# Patient Record
Sex: FEMALE
[De-identification: names, ages, dates, MRNs, and addresses within clinical notes are randomized per-mention and may not be internally consistent; named-entity substitution may affect disease eponyms.]

---

## 2018-07-17 ENCOUNTER — HOSPITAL ENCOUNTER (INPATIENT)
Dept: HOSPITAL 14 - H.ER | Age: 48
LOS: 5 days | Discharge: HOME | DRG: 134 | End: 2018-07-22
Attending: INTERNAL MEDICINE | Admitting: INTERNAL MEDICINE
Payer: MEDICAID

## 2018-07-17 VITALS — BODY MASS INDEX: 28.9 KG/M2

## 2018-07-17 DIAGNOSIS — M54.30: ICD-10-CM

## 2018-07-17 DIAGNOSIS — M51.26: ICD-10-CM

## 2018-07-17 DIAGNOSIS — J30.2: ICD-10-CM

## 2018-07-17 DIAGNOSIS — F41.9: ICD-10-CM

## 2018-07-17 DIAGNOSIS — F17.200: ICD-10-CM

## 2018-07-17 DIAGNOSIS — E87.6: ICD-10-CM

## 2018-07-17 DIAGNOSIS — I43: ICD-10-CM

## 2018-07-17 DIAGNOSIS — F32.9: ICD-10-CM

## 2018-07-17 DIAGNOSIS — M19.90: ICD-10-CM

## 2018-07-17 DIAGNOSIS — F45.9: ICD-10-CM

## 2018-07-17 DIAGNOSIS — Z79.899: ICD-10-CM

## 2018-07-17 DIAGNOSIS — I16.0: Primary | ICD-10-CM

## 2018-07-17 DIAGNOSIS — I11.9: ICD-10-CM

## 2018-07-17 DIAGNOSIS — R51: ICD-10-CM

## 2018-07-17 DIAGNOSIS — Z79.82: ICD-10-CM

## 2018-07-17 LAB
BASOPHILS # BLD AUTO: 0.1 K/UL (ref 0–0.2)
BASOPHILS NFR BLD: 0.8 % (ref 0–2)
BUN SERPL-MCNC: 13 MG/DL (ref 7–17)
CALCIUM SERPL-MCNC: 8.9 MG/DL (ref 8.4–10.2)
EOSINOPHIL # BLD AUTO: 0.1 K/UL (ref 0–0.7)
EOSINOPHIL NFR BLD: 1.2 % (ref 0–4)
ERYTHROCYTE [DISTWIDTH] IN BLOOD BY AUTOMATED COUNT: 13.5 % (ref 11.5–14.5)
GFR NON-AFRICAN AMERICAN: 60
HDLC SERPL-MCNC: 50 MG/DL (ref 30–70)
HGB BLD-MCNC: 13.4 G/DL (ref 12–16)
LDLC SERPL-MCNC: 75 MG/DL (ref 0–129)
LYMPHOCYTES # BLD AUTO: 3.8 K/UL (ref 1–4.3)
LYMPHOCYTES NFR BLD AUTO: 47.7 % (ref 20–40)
MCH RBC QN AUTO: 32.4 PG (ref 27–31)
MCHC RBC AUTO-ENTMCNC: 34.7 G/DL (ref 33–37)
MCV RBC AUTO: 93.2 FL (ref 81–99)
MONOCYTES # BLD: 0.6 K/UL (ref 0–0.8)
MONOCYTES NFR BLD: 7.4 % (ref 0–10)
NEUTROPHILS # BLD: 3.4 K/UL (ref 1.8–7)
NEUTROPHILS NFR BLD AUTO: 42.9 % (ref 50–75)
NRBC BLD AUTO-RTO: 0.1 % (ref 0–0)
PLATELET # BLD: 203 K/UL (ref 130–400)
PMV BLD AUTO: 9.2 FL (ref 7.2–11.7)
RBC # BLD AUTO: 4.15 MIL/UL (ref 3.8–5.2)
WBC # BLD AUTO: 7.9 K/UL (ref 4.8–10.8)

## 2018-07-17 NOTE — CARD
--------------- APPROVED REPORT --------------





Date of service: 07/17/2018



EXAM: Two-dimensional and M-mode echocardiogram with Doppler and 

color Doppler.



Other Information 

Quality : GoodRhythm : NSR



INDICATION

Hypertension/HCVD



2D DIMENSIONS 

IVSd1.16   (0.7-1.1cm)LVDd4.86   (3.9-5.9cm)

LVOT Diameter2.10   (1.8-2.4cm)PWd1.04   (0.7-1.1cm)

IVSs1.46   (0.8-1.2cm)LVDs3.48   (2.5-4.0cm)

FS (%) 28.4   %PWs1.35   (0.8-1.2cm)



M-Mode DIMENSIONS 

Left Atrium (MM)3.82   (2.5-4.0cm)IVSd0.97   (0.7-1.1cm)

Aortic Root3.35   (2.2-3.7cm)LVDd4.71   (4.0-5.6cm)

Aortic Cusp Exc.2.03   (1.5-2.0cm)PWd1.18   (0.7-1.1cm)

IVSs1.32   cmFS (%) 19   %

LVDs3.82   (2.0-3.8cm)PWs1.38   cm



Aortic Valve

AoV Peak Uxwjomkj27.1cm/sAoV VTI19.3cmAO Peak GR.4mmHg

LVOT Peak Ajyazfsq68.3cm/sLVOT VTI16.04cmAO Mean GR.2mmHg

FRANCESCO (VMAX)1.89cj7XOO (VTI)1.43cm2



Mitral Valve

MV E Dwblkrwd25.5cm/sMV DECEL [id number] A Afureidh85.5cm/s

MV AZB92snZ/A ratio1.1MVA (PHT)3.98cm2



TDI

Lateral E' Peak V10.43cm/sMedial E' Peak V6.52cm/sE/Lateral E'5.5

E/Medial E'8.8



Pulmonary Valve

PV Peak Rzdetgfk55.4cm/s



Tricuspid Valve

TR Peak Uqfcmfdr112im/sRAP ITVNAJPJ50kxEvCO Peak Gr.14mmHg

WVVY98aaHw



 LEFT VENTRICLE 

The left ventricle is normal size.

There is mild to moderate concentric left ventricular hypertrophy.

The systolic function is mildly impaired. LFEF 35 TO 40%

Regional wall motion abnormalities noted. Mild global hypokinesia of 

the left ventricle. 

The left ventricular diastolic function is normal.

No left ventricle thrombus noted on this study.

There is no ventricular septal defect visualized.

There is no left ventricular aneurysm. 

There is no mass noted in the left ventricle.



 RIGHT VENTRICLE 

The right ventricle is normal size.

There is normal right ventricular wall thickness.

The right ventricular systolic function is normal.



 ATRIA 

The left atrium size is normal.

The right atrium size is normal.

The interatrial septum is intact with no evidence for an atrial 

septal defect.



 AORTIC VALVE 

The aortic valve is normal in structure.

No aortic regurgitation is present.

There is no aortic valvular stenosis. 

There is no aortic valvular vegetation.



 MITRAL VALVE 

The mitral valve is normal in structure.

There is no evidence of mitral valve prolapse.

There is no mitral valve stenosis.

Mitral regurgitation is trace to mild.



 TRICUSPID VALVE 

The tricuspid valve is normal in structure.

There is no tricuspid valve regurgitation noted.

There is no tricuspid valve prolapse or vegetation.

There is no tricuspid valve stenosis. 



 PULMONIC VALVE 

The pulmonary valve is normal in structure.

There is no pulmonic valvular regurgitation. 

There is no pulmonic valvular stenosis.



 GREAT VESSELS 

The aortic root is normal in size.

The IVC is normal in size and collapses >50% with inspiration.



 PERICARDIAL EFFUSION 

The pericardium appears normal.

There is no pleural effusion.



<Conclusion>

The left ventricle is normal size.

There is mild to moderate concentric left ventricular hypertrophy.

The systolic function is mildly impaired. LFEF 35 TO 40%

Regional wall motion abnormalities noted. Mild global hypokinesia of 

the left ventricle.

The left ventricular diastolic function is normal.

Mitral regurgitation is trace to mild.

## 2018-07-17 NOTE — RAD
Date of service: 



07/17/2018



HISTORY:

stroke eval  



COMPARISON:

4/9/2014 



FINDINGS:



LUNGS:

No active pulmonary disease.



PLEURA:

No significant pleural effusion identified, no pneumothorax apparent.



CARDIOVASCULAR:

Normal.



OSSEOUS STRUCTURES:

No significant abnormalities.



VISUALIZED UPPER ABDOMEN:

Normal.



OTHER FINDINGS:

None.



IMPRESSION:

No active disease.

## 2018-07-17 NOTE — CT
Date of service: 



07/17/2018



PROCEDURE:  CT HEAD WITHOUT CONTRAST.



HISTORY:

stroke eval



COMPARISON:

None available. 



TECHNIQUE:

Axial computed tomography images were obtained through the head/brain 

without intravenous contrast.  



Radiation dose:



Total exam DLP = 839.02 mGy-cm.



This CT exam was performed using one or more of the following dose 

reduction techniques: Automated exposure control, adjustment of the 

mA and/or kV according to patient size, and/or use of iterative 

reconstruction technique.



FINDINGS:



HEMORRHAGE:

No intracranial hemorrhage. 



BRAIN:

No mass effect or edema.  No atrophy or chronic microvascular 

ischemic changes.



VENTRICLES:

Unremarkable. No hydrocephalus. 



CALVARIUM:

Unremarkable.



PARANASAL SINUSES:

Unremarkable as visualized. No significant inflammatory changes.



MASTOID AIR CELLS:

Unremarkable as visualized. No inflammatory changes.



OTHER FINDINGS:

None.



IMPRESSION:

Normal CT of the Head.  No evidence of acute intracranial hemorrhage.



Preliminary interpretation of this examination was reported by 

[facility] at 8:06 a.m. on 7/17/2018. There is concurrence of 

this report with the preliminary interpretation.

## 2018-07-17 NOTE — CP.PCM.CON
History of Present Illness





- History of Present Illness


History of Present Illness: 





Neurology Consultation Note:





Mrs. Ingram is a 47-year-old woman with a past medical history of HTN, 

depression, sciatica, herniated disc, who is in town for her daughter giving 

birth and missed several doses of her antihypertensive medication, developed 

dizziness and numbness of her face from the nose to the chin, bilaterally, that 

was associated with a brief episode of speech difficulty (lasting about 2 

minutes).  Her BP was 183/133 mm Hg.  CT scan of the head did not show any 

acute findings.  Symptoms resolved completely.  NIHSS was 0.  





Review of Systems





- Review of Systems


All systems: reviewed and no additional remarkable complaints except





Past Patient History





- Past Medical History & Family History


Past Medical History?: Yes





- Past Social History


Smoking Status: Current Some Days Smoker





- CARDIAC


Hx Cardiac Disorders: Yes


Hx Hypertension: Yes





- PULMONARY


Hx Respiratory Disorders: No





- NEUROLOGICAL


Hx Neurological Disorder: No





- HEENT


Hx HEENT Problems: No





- RENAL


Hx Chronic Kidney Disease: No





- ENDOCRINE/METABOLIC


Hx Endocrine Disorders: No





- HEMATOLOGICAL/ONCOLOGICAL


Hx Blood Disorders: No


Hx AIDS: No


Hx Human Immunodeficiency Virus (HIV): No





- INTEGUMENTARY


Hx Dermatological Problems: No





- MUSCULOSKELETAL/RHEUMATOLOGICAL


Hx Musculoskeletal Disorders: Yes


Hx Arthritis: Yes


Hx Back Pain: Yes


Hx Falls: No


Hx Herniated Disk: Yes


Other/Comment: Sciatic nerve damage





- GASTROINTESTINAL


Hx Gastrointestinal Disorders: No





- GENITOURINARY/GYNECOLOGICAL


Hx Genitourinary Disorders: No





- PSYCHIATRIC


Hx Psychophysiologic Disorder: Yes


Hx Anxiety: Yes


Hx Depression: Yes


Hx Substance Use: No





- SURGICAL HISTORY


Hx Surgeries: Yes


Hx  Section: Yes (x4)





- ANESTHESIA


Hx Anesthesia: Yes


Hx Anesthesia Reactions: No


Hx Malignant Hyperthermia: No


Has any member of the family had a problem w/ anesthesia?: No





Meds


Allergies/Adverse Reactions: 


 Allergies











Allergy/AdvReac Type Severity Reaction Status Date / Time


 


Penicillins Allergy  RASH Verified 18 05:46














- Medications


Medications: 


 Current Medications





Aspirin (Aspirin Chewable)  81 mg PO DAILY [facility]


Clonidine HCl (Catapres)  0.1 mg PO BID [facility]


   Last Admin: 18 16:47 Dose:  0.1 mg


Enalapril Maleate (Vasotec)  10 mg PO BID [facility]


   Last Admin: 18 16:53 Dose:  10 mg


Enoxaparin Sodium (Lovenox)  40 mg SC DAILY [facility]


   PRN Reason: Protocol











Physical Exam





- Neurological Exam


Neurological exam: Alert, CN II-XII Intact, Normal Gait, Oriented x3, Reflexes 

Normal





Results





- Vital Signs


Recent Vital Signs: 


 Last Vital Signs











Temp  98 F   18 15:56


 


Pulse  75   18 16:47


 


Resp  20   18 15:56


 


BP  135/91 H  18 16:47


 


Pulse Ox  100   18 15:56














- Labs


Result Diagrams: 


 18 06:28





 18 06:28


Labs: 


 Laboratory Results - last 24 hr











  18





  06:28 06:28 07:55


 


WBC   7.9 


 


RBC   4.15 


 


Hgb   13.4 


 


Hct   38.7 


 


MCV   93.2 


 


MCH   32.4 H 


 


MCHC   34.7 


 


RDW   13.5 


 


Plt Count   203 


 


MPV   9.2 


 


Neut % (Auto)   42.9 L 


 


Lymph % (Auto)   47.7 H 


 


Mono % (Auto)   7.4 


 


Eos % (Auto)   1.2 


 


Baso % (Auto)   0.8 


 


Neut # (Auto)   3.4 


 


Lymph # (Auto)   3.8 


 


Mono # (Auto)   0.6 


 


Eos # (Auto)   0.1 


 


Baso # (Auto)   0.1 


 


Sodium  141  


 


Potassium  3.5 L  


 


Chloride  108 H  


 


Carbon Dioxide  21 L  


 


Anion Gap  16  


 


BUN  13  


 


Creatinine  1.0  


 


Est GFR ( Amer)  > 60  


 


Est GFR (Non-Af Amer)  60  


 


POC Glucose (mg/dL)    78


 


Random Glucose  98  


 


Calcium  8.9  


 


Troponin I  < 0.0120  


 


Triglycerides  60  


 


Cholesterol  157  


 


LDL Cholesterol Direct  75  


 


HDL Cholesterol  50  














Assessment & Plan


(1) Paresthesia


Assessment and Plan: 


Likely due to acute hypertensive episode. However, a lacunar infarct should be 

excluded with an MRI of the brain.  IN addition:





1. Telemetry and carotid dopplers.


2. Continue aspirin 81 mg daily


3. HbA1c, B12, folate, vitamin D levels, TSH, T3, T4 and homocysteine levels


4. Echocardiogram 


5. PT/OT eval


6. case management





Thank you. 


Status: Acute   Priority: High

## 2018-07-17 NOTE — ED PDOC
- Laboratory Results


Result Diagrams: 


 07/17/18 06:28





 07/17/18 06:28


Interpretation Of Abn Labs: 3.5 k





- ECG


ECG: Positive for: Interpreted By Me, Viewed By Me


ECG Rhythm: Positive for: Normal QRS, Sinus Rhythm, Nonspecific Changes


O2 Sat by Pulse Oximetry: 99 (RA)


Pulse Ox Interpretation: Normal





- Progress


ED Course And Treament: 





700:  Took over care from Dr. Harris.  BENNETT on labs.  He gave lisinopril.  Pt. 

felt dizzy at [facility] while with daughter, so came to the ER.  She did not take 

her meds for bp as she forgot them.  





745:  Pt. states she had an episode that lasted 2 min of numbness to the entire 

lower half of her face and she couldn't speak.  Denies any numbness or tingles 

to the extremities.  Dizziness is coming and going.  No headache.  Vision is 

blurry in both eyes.  Has chronic lower leg decreased ROM due to herniated disk 

and cervical disk causing decreased ROM of left shoulder.  Code stroke called 

due to pt. symptoms in the ER.  





CN 2-12 intact.





807:  Spoke with Dr. Waterman.  Not a candidate for thrombolytics as nih 0.  Wants 

eval and asa if neg ct.  





1035:  Spoke with Dr. Hood who will admit tele and give orders when pt. 

reaches floor. 





Disposition


Counseled Patient/Family Regarding: Studies Performed, Diagnosis





- Clinical Impression


Clinical Impression: 


 Dizziness, Hypertension, Hypokalemia, Paresthesia








- POA


Present On Arrival: None





- Disposition


Disposition: Hospitalized as Observation Patient


Disposition Time: 10:36


Condition: FAIR





NIHSS Stroke Scale





- Date/Time Evaluation Performed


Date Performed: 07/17/18


Time Performed: 07:45


When Was NIHSS Performed: Baseline





- How Severe is the Stroke


Level of Consciousness: 0=Alert


LOC to Questions: 0=Both comments correct


LOC to commands: 0=Obeys both correctly


Best Gaze: 0=Normal


Visual: 0=No visual loss


Facial: 0=Normal


Motor Arm - Left: 0=No drift


Motor Arm - Right: 0=No drift


Motor Leg - Left: 0=No drift


Motor Leg - Right: 0=No drift


Limb Ataxia: 0=Absent


Sensory: 0=Normal


Best Language: 0=No aphasia


Dysarthia: 0=Normal articulation


Extinction & Inattention (Neglect): 0=Normal, no object


Score: 0





rTPA Inclusion/Exclusion





- Refusal of Treatment


Patient Refused Treatment: No





- Inclusion Criteria for Altepase


Patient is 18 years or Older: Yes


Clinical DX Ischemic Stroke Cause Neurological Deficit: No


Time of Onset Established Less Than 270 Mins Before TX Begin: Yes


Risk/Benefit Discussed With Patient/Family Member Present: No

## 2018-07-17 NOTE — US
Date of service: 



07/17/2018



PROCEDURE:  Duplex ultrasound  of the carotid and vertebral arteries. 



HISTORY:

uncontrolled htn



COMPARISON:

None available. 



TECHNIQUE:

Grayscale and duplex Doppler evaluation of the cervical carotid and 

vertebral arteries were performed. The common carotid, carotid 

bifurcations and cervical ICA and proximal ECA were evaluated.  The 

vertebral arteries were evaluated for gross patency and direction. 



FINDINGS:



RIGHT CAROTID ARTERIES:

Common Carotid Artery:  Intimal thickening is present  Maximal flow 

velocity of 59.3 cm/s.



Carotid Bifurcation:  Intimal thickening is present 



Internal Carotid Artery:Heterogeneous plaque formation. Tortuous 

right ICA Maximal flow velocity of 85.9 cm/s.



External Carotid Artery (proximal branches): Normal. Maximal flow 

velocity of 77.7 cm/s.



ICA/CCA Ratio: 1.4



LEFT CAROTID ARTERIES:

Common Carotid Artery: Normal. Maximal flow velocity of 53.5 cm/s.



Carotid Bifurcation: Normal.



Internal Carotid Artery:Normal. Maximal flow velocity of 88.0 cm/s.



External Carotid Artery (proximal branches): Normal. Maximal flow 

velocity of 69.6 cm/s.



ICA/CCA Ratio: 1.6



VERTEBRAL ARTERIES:

Right Vertebral Artery: Patent. Antegrade flow.



Left Vertebral Artery: Patent. Antegrade flow.



OTHER FINDINGS:

None.



IMPRESSION:

Right  ICA degree of stenosis: Less than 50%



Left    ICA degree of stenosis: Less than 50%



_____________________________________________________



Reference 



Internal Carotid Artery (ICA) Peak Systolic Velocity (PSV) for above:



1. Less than 50% stenosis less than 125 cm/s peak systolic velocity



2. 50-69% stenosis   125-230cm/s peak systolic velocity



3. Greater than 70% but less than near occlusion  greater than 230 

cm/s peak systolic velocity

## 2018-07-17 NOTE — CARD
--------------- APPROVED REPORT --------------





Date of service: 07/17/2018



EKG Measurement

Heart [id number]

TX [id number]

[id number]

[id number]

[id number]



<Conclusion>

Normal sinus rhythm

Minimal voltage criteria for LVH, may be normal variant

T wave abnormality, consider lateral ischemia

Prolonged QT

Abnormal ECG

## 2018-07-17 NOTE — ED PDOC
HPI: General Adult


Time Seen by Provider: 18 05:52


Chief Complaint (Nursing): Headache


Chief Complaint (Provider): Headache, Dizziness


History Per: Patient


History/Exam Limitations: no limitations


Onset/Duration Of Symptoms: Days (x1)


Current Symptoms Are (Timing): Still Present


Additional Complaint(s): 


48 y/o female with a PMHx of HTN, depression, sciatica, herniated disc, and 

seasonal allergies presenting for evaluation of acute onset dizziness and 

headache x1 day. Patient states that she came to [place] to attend her daughters 

childbirth, but left her Lisinopril at home in [place]. Patient states she 

regularly takes her Lisinopril, but has not done so in a week. Patient reports 

feeling lightheaded while upstairs and came to ED for evaluation where it was 

discovered her blood pressure was 182/133. 





PMD: [facility] in [place]








Past Medical History


Reviewed: Historical Data, Nursing Documentation, Vital Signs


Vital Signs: 


 Last Vital Signs











Temp  98.0 F   18 00:05


 


Pulse  73   18 00:05


 


Resp  18   18 00:05


 


BP  137/89   18 00:05


 


Pulse Ox  98   18 00:05














- Medical History


PMH: Arthritis, Depression, HTN


Other PMH: Sciatica, Seasonal Allergies, Herniated Disc





- Surgical History


Surgical History: No Surg Hx





- Family History


Family History: States: Unknown Family Hx





- Social History


Current smoker - smoking cessation education provided: No


Alcohol: None


Drugs: Denies





- Immunization History


Hx Tetanus Toxoid Vaccination: No


Hx Influenza Vaccination: No


Hx Pneumococcal Vaccination: No





- Home Medications


Home Medications: 


 Ambulatory Orders











 Medication  Instructions  Recorded


 


Citalopram [celeXA] 10 mg PO DAILY 18


 


Fluticasone Nasal [Flonase] 2 spray DIDIER DAILY PRN 18


 


Lisinopril [Zestril] 2.5 mg PO DAILY 18


 


Loratadine [Claritin] 10 mg PO DAILY 18














- Allergies


Allergies/Adverse Reactions: 


 Allergies











Allergy/AdvReac Type Severity Reaction Status Date / Time


 


Penicillins Allergy  RASH Verified 18 05:46














Review of Systems


ROS Statement: Except As Marked, All Systems Reviewed And Found Negative


Neurological: Positive for: Headache, Dizziness





Physical Exam





- Reviewed


Nursing Documentation Reviewed: Yes


Vital Signs Reviewed: Yes





- Physical Exam


Appears: Positive for: Non-toxic, No Acute Distress


Head Exam: Positive for: ATRAUMATIC, NORMAL INSPECTION, NORMOCEPHALIC


Skin: Positive for: Normal Color, Warm, Dry.  Negative for: Rash


Eye Exam: Positive for: EOMI, Normal appearance, PERRL


ENT: Positive for: Normal ENT Inspection


Neck: Positive for: Normal, Painless ROM, Supple


Cardiovascular/Chest: Positive for: Regular Rate, Rhythm.  Negative for: Murmur


Respiratory: Positive for: Normal Breath Sounds.  Negative for: Respiratory 

Distress


Gastrointestinal/Abdominal: Positive for: Normal Exam, Soft.  Negative for: 

Tenderness


Back: Positive for: Normal Inspection.  Negative for: L CVA Tenderness, R CVA 

Tenderness, Vertebral Tenderness


Extremity: Positive for: Normal ROM.  Negative for: Pedal Edema, Deformity


Neurologic/Psych: Positive for: Alert, Oriented.  Negative for: Motor/Sensory 

Deficits





- Laboratory Results


Result Diagrams: 


 18 06:28





 18 06:28





- ECG


O2 Sat by Pulse Oximetry: 99 (RA)


Pulse Ox Interpretation: Normal





Medical Decision Making


Medical Decision Makin:55


Impression: 48 y/o female with hypertensive urgency


Plan:


-EKG


-BMP


-Urine pregnancy


-Urine dipstick


-CBC w/ differential


-Labetalol 20mg IVP


-Reevaluation





Patient s/o to Dr SIRISHA Jenkins at 7AM





--------------------------------------------------------------------------------

----------------------------------


Scribe Attestation:


Documented by Reynaldo Melendrez, acting as a scribe for Rom Harris MD. 





Provider Scribe Attestation:


All medical record entries made by the Scribe were at my direction and 

personally dictated by me. I have reviewed the chart and agree that the record 

accurately reflects my personal performance of the history, physical exam, 

medical decision making, and the department course for this patient. I have 

also personally directed, reviewed, and agree with the discharge instructions 

and disposition.





Disposition





- Clinical Impression


Clinical Impression: 


 Hypertension








- Disposition


Disposition: Transfer of Care


Disposition Time: 07:00


Condition: FAIR

## 2018-07-18 LAB
APTT BLD: 35.6 SECONDS (ref 25.6–37.1)
BUN SERPL-MCNC: 15 MG/DL (ref 7–17)
CALCIUM SERPL-MCNC: 8.4 MG/DL (ref 8.4–10.2)
FOLATE SERPL-MCNC: 14.7 NG/ML
GFR NON-AFRICAN AMERICAN: > 60
HDLC SERPL-MCNC: 45 MG/DL (ref 30–70)
INR PPP: 1 (ref 0.9–1.2)
LDLC SERPL-MCNC: 71 MG/DL (ref 0–129)
PROTHROMBIN TIME: 11.1 SECONDS (ref 9.8–13.1)
T3: 1.31 NMOL/L (ref 1.49–2.6)
T4 SERPL-MCNC: 8.53 UG/DL (ref 5.5–11)
VIT B12 SERPL-MCNC: 353 PG/ML (ref 239–931)

## 2018-07-18 RX ADMIN — ENOXAPARIN SODIUM SCH MG: 40 INJECTION SUBCUTANEOUS at 08:11

## 2018-07-18 NOTE — MRI
Date of service: 



07/18/2018



PROCEDURE:  MRI BRAIN WITHOUT CONTRAST



HISTORY:

r/o tia



COMPARISON:

None. 



TECHNIQUE:

Multiplanar, multisequence MR images of the brain were obtained 

without intravenous contrast enhancement.



FINDINGS:



HEMORRHAGE:

None



DWI:

No evidence of an acute or early subacute infarction.



BRAIN PARENCHYMA:

No mass effect or edema. No atrophy or chronic microvascular ischemic 

changes.



VENTRICLES:

Unremarkable. No hydrocephalus.



CRANIUM:

Unremarkable.



ORBITS:

Grossly unremarkable.



PARANASAL SINUSES/MASTOIDS:

Clear



VASCULAR SYSTEM:

Skull base flow voids intact.



OTHER FINDINGS:

None. 



IMPRESSION:

Unremarkable non contrast enhanced MRI of the brain.

## 2018-07-18 NOTE — CP.PCM.HP
History of Present Illness





- History of Present Illness


History of Present Illness: 





46 y/o F with PMHx HTN, Depression, Cervical/Lumbar herniated disc with Sciatica

, and seasonal allergies presents to ED complaining of dizziness, and 

headaches. Patient states that she came to [place] to attend her daughters 

childbirth, but left her Lisinopril at home in [place]. Patient states she 

regularly takes her Lisinopril, but has not been taken it for about one week.  

Patient reports that yesterday she had two episodes of numbness to the entire 

lower half of her face and she couldn't speak, each episode lasted for couple 

of minutes and resolved. In ED BP was found to be elevated. 


Code Stroke was called in ER. NIHSS was 0.





Present on Admission





- Present on Admission


Any Indicators Present on Admission: No


History of DVT/PE: No


History of Uncontrolled Diabetes: No


Urinary Catheter: No


Decubitus Ulcer Present: No





Review of Systems





- Review of Systems


All systems: reviewed and no additional remarkable complaints except (as per HPI

)





- Constitutional


Constitutional: Other (no)





- EENT


Eyes: Other (no)


Nose/Mouth/Throat: Other (no)





- Cardiovascular


Cardiovascular: Other (no)





- Respiratory


Respiratory: Other (no)





- Gastrointestinal


Gastrointestinal: Other (no)





- Genitourinary


Genitourinary: Other (no)





- Musculoskeletal


Musculoskeletal: Back Pain, Neck Pain, Radiating Pain into Limb





- Integumentary


Integumentary: Other (no)





- Neurological


Neurological: Paresthesias





- Psychiatric


Psychiatric: Other (no)





- Endocrine


Endocrine: Other (no)





- Hematologic/Lymphatic


Hematologic: Other (no)





Past Patient History





- Past Medical History & Family History


Past Medical History?: Yes





- Past Social History


Alcohol: None


Drugs: Denies





- CARDIAC


Hx Hypertension: Yes





- PULMONARY


Hx Respiratory Disorders: No





- NEUROLOGICAL


Hx Neurological Disorder: No





- HEENT


Hx HEENT Problems: No





- RENAL


Hx Chronic Kidney Disease: No





- ENDOCRINE/METABOLIC


Hx Endocrine Disorders: No





- HEMATOLOGICAL/ONCOLOGICAL


Hx Blood Disorders: No


Hx AIDS: No


Hx Human Immunodeficiency Virus (HIV): No





- INTEGUMENTARY


Hx Dermatological Problems: No





- MUSCULOSKELETAL/RHEUMATOLOGICAL


Hx Arthritis: Yes





- GASTROINTESTINAL


Hx Gastrointestinal Disorders: No





- GENITOURINARY/GYNECOLOGICAL


Hx Genitourinary Disorders: No





- PSYCHIATRIC


Hx Depression: Yes





- SURGICAL HISTORY


Hx Surgeries: Yes


Hx  Section: Yes (x4)





- ANESTHESIA


Hx Anesthesia: Yes


Hx Anesthesia Reactions: No


Hx Malignant Hyperthermia: No


Has any member of the family had a problem w/ anesthesia?: No





Meds


Allergies/Adverse Reactions: 


 Allergies











Allergy/AdvReac Type Severity Reaction Status Date / Time


 


Penicillins Allergy  RASH Verified 18 05:46














Physical Exam





- Constitutional


Appears: Non-toxic, No Acute Distress





- Head Exam


Head Exam: ATRAUMATIC, NORMOCEPHALIC





- Eye Exam


Eye Exam: EOMI.  absent: Periorbital tenderness





- ENT Exam


ENT Exam: Mucous Membranes Moist





- Neck Exam


Neck exam: Positive for: Normal Inspection





- Respiratory Exam


Respiratory Exam: Clear to Auscultation Bilateral, NORMAL BREATHING PATTERN





- Cardiovascular Exam


Cardiovascular Exam: REGULAR RHYTHM, RRR, +S1, +S2





- GI/Abdominal Exam


GI & Abdominal Exam: Normal Bowel Sounds, Soft, Tenderness





- Extremities Exam


Extremities exam: Positive for: calf tenderness, normal inspection, pedal edema





- Back Exam


Back exam: NORMAL INSPECTION





- Neurological Exam


Neurological exam: Alert, CN II-XII Intact, Oriented x3


Additional comments: 





no focal/motor  sensory deficit





- Skin


Skin Exam: Dry, Intact, Normal Color, Warm





Results





- Vital Signs


Recent Vital Signs: 





 Last Vital Signs











Temp  98.0 F   18 12:35


 


Pulse  73   18 12:35


 


Resp  18   18 12:35


 


BP  126/86   18 12:35


 


Pulse Ox  99   18 12:35














- Labs


Result Diagrams: 


 18 06:28





 18 04:20


Labs: 





 Laboratory Results - last 24 hr











  18





  04:20


 


Sodium  139


 


Potassium  3.8


 


Chloride  109 H


 


Carbon Dioxide  22


 


Anion Gap  12


 


BUN  15


 


Creatinine  0.9


 


Est GFR ( Amer)  > 60


 


Est GFR (Non-Af Amer)  > 60


 


Random Glucose  85


 


Calcium  8.4


 


Triglycerides  58


 


Cholesterol  143


 


LDL Cholesterol Direct  71


 


HDL Cholesterol  45


 


Vitamin B12  353


 


Folate  14.7


 


Thyroxine (T4)  8.53


 


Total T3  1.31 L


 


TSH 3rd Generation  1.13














Assessment & Plan


(1) Hypertension


Status: Acute   





(2) Paresthesia


Status: Acute   





(3) Cervical spine pain


Status: Chronic   





(4) Lower back pain


Status: Chronic   





- Assessment and Plan (Free Text)


Plan: 





Telemetry unit


Cont Cardiac monitor


BP control, currently in Enalapril and clonidine


c/w aspirin 81 mg


will f/u troponin I x 2


will f/u Brain MRI w/o contrast results


will f/u Cervical spine MRI w/o contrast results


will f/u Neuro work up


PT/OT eval


Pain control PRN


Head CT on admission reported as normal


Carotid US reported as less 50 % 


Echo results reviewed


Neurology on consult. Recommendations are appreciated. 


Cardiology on consult. recommendations are appreciated, ECHO 35-40 % , Cardiac  

Cath





- Date & Time


Date: 18


Time: 12:00

## 2018-07-18 NOTE — CP.PCM.PN
Subjective





- Date & Time of Evaluation


Date of Evaluation: 07/18/18


Time of Evaluation: 09:43





- Subjective


Subjective: 





Ms. Ingram was seen and examined at the bedside. She is alert, oriented in 

all spheres. She denies any headache, lightheadedness, nausea, or vomiting. She 

complains of pain in the cervical area, tender to touch, limited neck movement 

from side-to-side. She is able to follow all commands and sensation is 

symmetrical. She had an episode of dizziness last night but no untoward events.





Objective





- Vital Signs/Intake and Output


Vital Signs (last 24 hours): 


 











Temp Pulse Resp BP Pulse Ox


 


 98 F   69   20   132/91 H  100 


 


 07/18/18 07:45  07/18/18 08:10  07/18/18 07:45  07/18/18 08:10  07/18/18 07:45











- Medications


Medications: 


 Current Medications





Aspirin (Aspirin Chewable)  81 mg PO DAILY [facility]


   Last Admin: 07/18/18 08:10 Dose:  81 mg


Clonidine HCl (Catapres)  0.1 mg PO BID [facility]


   Last Admin: 07/18/18 08:10 Dose:  0.1 mg


Enalapril Maleate (Vasotec)  10 mg PO BID [facility]


   Last Admin: 07/18/18 08:10 Dose:  10 mg


Enoxaparin Sodium (Lovenox)  40 mg SC DAILY [facility]


   PRN Reason: Protocol


   Last Admin: 07/18/18 08:11 Dose:  40 mg











- Labs


Labs: 


 





 07/17/18 06:28 





 07/18/18 04:20 











- Constitutional


Appears: No Acute Distress





- Head Exam


Head Exam: NORMAL INSPECTION





- Eye Exam


Pupil Exam: PERRL





- Neurological Exam


Neurological Exam: Alert, Awake, Oriented x3


Neuro motor strength exam: Left Upper Extremity: 5, Right Upper Extremity: 5, 

Left Lower Extremity: 5, Right Lower Extremity: 5


Additional comments: 





alert, oriented in all spheres, sensation is intact, follows commands.





Assessment and Plan


(1) Paresthesia


Assessment & Plan: 


Case discussed with Dr. Waterman, continue all current medical, physical 

therapies. Recommend MRI of the brain to rule out any lacunar infarct, MRI of 

the cervical area to evaluate pain, blood pressure control.


Status: Acute

## 2018-07-18 NOTE — MRI
Date of service: 



07/18/2018



PROCEDURE:  MR CERVICAL SPINE WITHOUT CONTRAST



HISTORY:

pain in the cervical area, paresthesia of the uppe



COMPARISON:

None available. 



TECHNIQUE:

Multiecho multiplanar sequences were performed through the cervical 

spine without the use of intravenous contrast.



FINDINGS:

Normal lordotic curvature. 



Craniocervical junction unremarkable.



Vertebral body heights preserved.



No marrow signal abnormality.



Normal cervical cord.



No paraspinal abnormality. 



C2-C3:

No disc herniation, spinal canal stenosis or neural foraminal 

narrowing. 



C3-C4:

No  disc herniation, spinal canal stenosis or neural foraminal 

narrowing. 



C4-C5:

No disc herniation, spinal canal stenosis or neural foraminal 

narrowing. 



C5-C6:

Mild disc bulge without stenosis 



C6-C7:

No disc herniation, spinal canal stenosis or neural foraminal 

narrowing. 



C7-T1:

No  disc herniation, spinal canal stenosis or neural foraminal 

narrowing. 



OTHER FINDINGS:

None. 



IMPRESSION:

Mild disc bulge at C5-6 without stenosis.

## 2018-07-19 RX ADMIN — ENOXAPARIN SODIUM SCH MG: 40 INJECTION SUBCUTANEOUS at 09:06

## 2018-07-19 RX ADMIN — PANTOPRAZOLE SODIUM SCH MG: 40 TABLET, DELAYED RELEASE ORAL at 16:12

## 2018-07-19 NOTE — CP.PCM.PN
Subjective





- Date & Time of Evaluation


Date of Evaluation: 07/19/18


Time of Evaluation: 10:21





- Subjective


Subjective: 





Ms. Ingram was seen and examined at the bedside. She is alert, oriented in 

all spheres. She denies any headache, lightheadedness, nausea, or vomiting. She 

is aware of her prolong uncontrolled hypertension resulted in her current 

situation. She is able to perform her own ADL's. The patient is schedule for 

cardiac catheterization in am. There was no untoward events.





Objective





- Vital Signs/Intake and Output


Vital Signs (last 24 hours): 


 











Temp Pulse Resp BP Pulse Ox


 


 97.9 F   72   20   124/85   100 


 


 07/19/18 08:26  07/19/18 09:06  07/19/18 08:26  07/19/18 09:06  07/19/18 08:26











- Medications


Medications: 


 Current Medications





Aspirin (Aspirin Chewable)  81 mg PO DAILY [facility]


   Last Admin: 07/19/18 09:06 Dose:  81 mg


Atorvastatin Calcium (Lipitor)  10 mg PO DAILY [facility]


   Last Admin: 07/19/18 09:06 Dose:  10 mg


Carvedilol (Coreg)  3.125 mg PO Q12 [facility]


   Last Admin: 07/19/18 09:06 Dose:  3.125 mg


Clonidine HCl (Catapres)  0.1 mg PO BID [facility]


   Last Admin: 07/19/18 09:06 Dose:  0.1 mg


Enalapril Maleate (Vasotec)  10 mg PO BID [facility]


   Last Admin: 07/19/18 09:06 Dose:  10 mg


Enoxaparin Sodium (Lovenox)  40 mg SC DAILY [facility]


   PRN Reason: Protocol


   Last Admin: 07/19/18 09:06 Dose:  40 mg


Furosemide (Lasix)  40 mg IVP DAILY [facility]











- Labs


Labs: 


 





 07/17/18 06:28 





 07/18/18 04:20 





 











PT  11.1 Seconds (9.8-13.1)   07/18/18  14:25    


 


INR  1.0  (0.9-1.2)   07/18/18  14:25    


 


APTT  35.6 Seconds (25.6-37.1)   07/18/18  14:25    














- Constitutional


Appears: No Acute Distress





- Head Exam


Head Exam: NORMAL INSPECTION





- Eye Exam


Pupil Exam: PERRL





- Neurological Exam


Neurological Exam: Alert, Awake, Oriented x3


Neuro motor strength exam: Left Upper Extremity: 5, Right Upper Extremity: 5, 

Left Lower Extremity: 5, Right Lower Extremity: 5


Additional comments: 





neurological unchanged from previous examination.





Assessment and Plan


(1) Paresthesia


Assessment & Plan: 


Case discussed with Dr. Waterman, continue all current medical, physical 

therapies. MRI of the brain showed Unremarkable non contrast enhanced MRI of 

the brain. Recommend to follow all orders from cardiology,blood pressure 

control.Paresthesia is a result of uncontrolled hypertension, Neurology is 

signing off from this case. Please re-consult if needed. 


Status: Acute

## 2018-07-19 NOTE — CP.PCM.PN
Subjective





- Date & Time of Evaluation


Date of Evaluation: 07/19/18


Time of Evaluation: 11:50





- Subjective


Subjective: 





F/U  Elevated BP.














Objective





- Vital Signs/Intake and Output


Vital Signs (last 24 hours): 


 











Temp Pulse Resp BP Pulse Ox


 


 97.9 F   77   18   123/85   99 


 


 07/19/18 12:36  07/19/18 12:36  07/19/18 12:36  07/19/18 12:36  07/19/18 12:36











- Medications


Medications: 


 Current Medications





Aspirin (Aspirin Chewable)  81 mg PO DAILY [facility]


   Last Admin: 07/19/18 09:06 Dose:  81 mg


Atorvastatin Calcium (Lipitor)  10 mg PO DAILY [facility]placed [facility]


   Last Admin: 07/19/18 09:06 Dose:  10 mg


Carvedilol (Coreg)  3.125 mg PO Q12 Replaced by [facility]


   Last Admin: 07/19/18 09:06 Dose:  3.125 mg


Clonidine HCl (Catapres)  0.1 mg PO BID Replaced by [facility]


   Last Admin: 07/19/18 09:06 Dose:  0.1 mg


Enalapril Maleate (Vasotec)  10 mg PO BID Replaced by [facility]


   Last Admin: 07/19/18 09:06 Dose:  10 mg


Enoxaparin Sodium (Lovenox)  40 mg SC DAILY Replaced by [facility]


   PRN Reason: Protocol


   Last Admin: 07/19/18 09:06 Dose:  40 mg


Furosemide (Lasix)  40 mg IVP DAILY Replaced by [facility]


   Last Admin: 07/19/18 12:30 Dose:  40 mg


Pantoprazole Sodium (Protonix Ec Tab)  40 mg PO DAILY Replaced by [facility]











- Labs


Labs: 


 





 07/17/18 06:28 





 07/18/18 04:20 





 











PT  11.1 Seconds (9.8-13.1)   07/18/18  14:25    


 


INR  1.0  (0.9-1.2)   07/18/18  14:25    


 


APTT  35.6 Seconds (25.6-37.1)   07/18/18  14:25    














- Constitutional


Appears: No Acute Distress





- Head Exam


Head Exam: NORMAL INSPECTION





- Eye Exam


Eye Exam: PERRL





- ENT Exam


ENT Exam: Normal Exam





- Neck Exam


Neck Exam: Normal Inspection





- Respiratory Exam


Respiratory Exam: Clear to Ausculation Bilateral





- Cardiovascular Exam


Cardiovascular Exam: REGULAR RHYTHM, RRR, +S1, +S2





- GI/Abdominal Exam


GI & Abdominal Exam: Soft, Normal Bowel Sounds





- Extremities Exam


Extremities Exam: Normal Inspection





- Back Exam


Back Exam: NORMAL INSPECTION





- Neurological Exam


Neurological Exam: Alert, CN II-XII Intact, Oriented x3


Additional comments: 





No motor/sensory deficit.





- Skin


Skin Exam: Normal Color, Warm





Assessment and Plan


(1) Hypertension


Status: Acute   





(2) Paresthesia


Status: Acute   





(3) Cervical spine pain


Status: Chronic   





(4) Lower back pain


Status: Chronic

## 2018-07-19 NOTE — CON
DATE:  07/18/2018



CARDIOLOGY CONSULTATION



REASON FOR CONSULTATION:  Syncopal episode.



HISTORY OF PRESENT ILLNESS:  The patient is a 47-year-old female who has

history of hypertension.  She is a smoker and a drinker, was in the

hospital for birth of her daughter.  However, while in maternity floor,

when she is being handed the chart of her daughter, she lost consciousness

and fell on her back.  The patient does not recall experiencing

palpitation.  No reported tongue biting or urinary incontinence, and no

witnessed seizures.  The patient has been experiencing dyspnea on exertion

lately as well as leg swelling.



SOCIAL HISTORY:  The patient is a smoker and drinker.



MEDICATION:  Aspirin 81 mg once a day, clonidine 0.1 mg twice a day,

Lipitor 10 mg once a day, Lovenox 40 mg subcutaneously once a day, Vasotec

10 mg p.o. twice a day.



REVIEW OF SYSTEMS:  No nausea or vomiting.  No fever or chills.



PHYSICAL EXAMINATION:

GENERAL:  The patient is a middle aged female who does not appear to be in

acute distress.

VITAL SIGNS:  Blood pressure 126/86, heat rate 73, temperature 98,

respirations 18.

HEENT:  Normocephalic.

NECK:  No JVD.

CHEST:  Clear.

HEART:  S1 and S2 regular.

ABDOMEN:  Soft.

EXTREMITIES:  1+ pitting edema.



LABORATORY DATA:  SMA-7; sodium 139, potassium 3.8, chloride 109, CO2 of

22, glucose 85, BUN 15, creatinine 0.9.  Yesterday's potassium was 3.5. 

TSH level is 1.31.  Hemoglobin, hematocrit, white count, and platelet count

are within normal limits.  Head CT scan without contrast, normal study.  No

evidence of intracranial hemorrhage.  Chest x-ray was unremarkable. 

Cervical spine MRI was performed as well as brain MRI; however, reports are

still pending.  Carotid Doppler less than 50% stenosis bilaterally.  EKG

revealed sinus rhythm, minimal voltage criteria for LVH, Q-wave abnormality

to consider lateral ischemia.  Echocardiographic study revealed mild to

moderate concentric LVH with mildly impaired systolic function that was

measured at 35% to 40% ejection fraction with regional wall abnormality.



ASSESSMENT:

1.  Syncopal episode.

2.  Reduced left ventricular systolic function.

3.  Rule out underlying coronary artery disease.



RECOMMENDATIONS:  Continue aspirin 81 mg once a day, Vasotec at 10 mg twice

a day, Lipitor at 10 mg once a day, Lovenox at 40 mg subcutaneously once a

day.  Start Coreg at 3.125 mg twice a day.  Obtain serum D-dimer and urine

drug screen.  Cardiac catheterization will be recommended.





__________________________________________

Terrance Baker MD





DD:  07/18/2018 14:06:16

DT:  07/18/2018 15:01:16

Job # [id number]

## 2018-07-19 NOTE — PN
DATE:  07/19/2018



SUBJECTIVE:  The patient is experiencing shortness of breath on minimal

exertion.  She denies any chest pain.



PHYSICAL EXAMINATION

VITAL SIGNS:  Blood pressure 124/85, heart rate 76, temperature 97.9, and

respirations 20.

HEENT:  Normocephalic.

CHEST:  Minimal basal rhonchi.

HEART:  S1 and S2 regular.

EXTREMITIES:  Trace leg edema.



LABORATORY DATA:  Brain MRI report is unremarkable, non-contrast enhance

MRI of the brain.  Cervical spinal canal MRI, mild disk bulge at C5-6

without stenosis.



ASSESSMENT:

1.  Uncontrolled hypertension.

2.  Cardiomyopathy.

3.  Status post syncopal episode.



RECOMMENDATIONS:  Continue aspirin 81 mg once a day, clonidine 0.1 mg twice

a day, Coreg 3.125 mg twice a day, Vasotec 10 mg twice a day.  I requested

Lasix to be started 20 mg intravenously daily and K-Dur 20 mEq orally

daily.  The patient is scheduled for cardiac catheterization tomorrow at

[facility].





__________________________________________

Terrance Baker MD



DD:  07/19/2018 12:14:19

DT:  07/19/2018 12:58:28

Job # [id number]

## 2018-07-20 ENCOUNTER — HOSPITAL ENCOUNTER (OUTPATIENT)
Dept: HOSPITAL 31 - C.CATHLAB | Age: 48
Discharge: ANOTHER HOSPITAL | End: 2018-07-20
Attending: SPECIALIST
Payer: COMMERCIAL

## 2018-07-20 VITALS — BODY MASS INDEX: 34.9 KG/M2

## 2018-07-20 DIAGNOSIS — I42.9: Primary | ICD-10-CM

## 2018-07-20 DIAGNOSIS — R07.9: ICD-10-CM

## 2018-07-20 LAB
BUN SERPL-MCNC: 16 MG/DL (ref 7–17)
CALCIUM SERPL-MCNC: 8.9 MG/DL (ref 8.4–10.2)
GFR NON-AFRICAN AMERICAN: 54

## 2018-07-20 RX ADMIN — OXYCODONE HYDROCHLORIDE AND ACETAMINOPHEN PRN TAB: 5; 325 TABLET ORAL at 20:59

## 2018-07-20 RX ADMIN — PANTOPRAZOLE SODIUM SCH: 40 TABLET, DELAYED RELEASE ORAL at 08:23

## 2018-07-21 RX ADMIN — PANTOPRAZOLE SODIUM SCH MG: 40 TABLET, DELAYED RELEASE ORAL at 10:33

## 2018-07-21 RX ADMIN — OXYCODONE HYDROCHLORIDE AND ACETAMINOPHEN PRN TAB: 5; 325 TABLET ORAL at 21:09

## 2018-07-21 RX ADMIN — OXYCODONE HYDROCHLORIDE AND ACETAMINOPHEN PRN TAB: 5; 325 TABLET ORAL at 02:30

## 2018-07-21 RX ADMIN — OXYCODONE HYDROCHLORIDE AND ACETAMINOPHEN PRN TAB: 5; 325 TABLET ORAL at 10:31

## 2018-07-21 NOTE — CP.PCM.PN
Subjective





- Date & Time of Evaluation


Date of Evaluation: 07/21/18


Time of Evaluation: 12:50





- Subjective


Subjective: 





F/U Elevated BP





Objective





- Vital Signs/Intake and Output


Vital Signs (last 24 hours): 


 











Temp Pulse Resp BP Pulse Ox


 


 98 F   82   20   147/86   99 


 


 07/21/18 13:00  07/21/18 13:00  07/21/18 13:00  07/21/18 13:00  07/21/18 13:00











- Medications


Medications: 


 Current Medications





Aspirin (Aspirin Chewable)  81 mg PO DAILY [facility]


   Last Admin: 07/21/18 10:32 Dose:  81 mg


Atorvastatin Calcium (Lipitor)  10 mg PO DAILY [facility]


   Last Admin: 07/21/18 10:33 Dose:  10 mg


Carvedilol (Coreg)  3.125 mg PO Q12 [facility]


   Last Admin: 07/21/18 10:32 Dose:  3.125 mg


Enalapril Maleate (Vasotec)  10 mg PO BID [facility]


   Last Admin: 07/21/18 10:33 Dose:  10 mg


Enoxaparin Sodium (Lovenox)  40 mg SC DAILY [facility]


   PRN Reason: Protocol


   Last Admin: 07/19/18 09:06 Dose:  40 mg


Furosemide (Lasix)  20 mg IVP DAILY [facility]


   Last Admin: 07/21/18 10:34 Dose:  20 mg


Oxycodone/Acetaminophen (Percocet 5/325 Mg Tab)  1 tab PO Q4 [facility]


   PRN Reason: Pain, moderate (4-7)


   Stop: 07/23/18 20:51


   Last Admin: 07/21/18 10:31 Dose:  1 tab


Pantoprazole Sodium (Protonix Ec Tab)  40 mg PO DAILY [facility]


   Last Admin: 07/21/18 10:33 Dose:  40 mg











- Labs


Labs: 


 





 07/17/18 06:28 





 07/20/18 04:20 





 











PT  11.1 Seconds (9.8-13.1)   07/18/18  14:25    


 


INR  1.0  (0.9-1.2)   07/18/18  14:25    


 


APTT  35.6 Seconds (25.6-37.1)   07/18/18  14:25    














- Constitutional


Appears: No Acute Distress





- Head Exam


Head Exam: NORMAL INSPECTION





- Eye Exam


Eye Exam: PERRL





- ENT Exam


ENT Exam: Mucous Membranes Moist





- Neck Exam


Neck Exam: Normal Inspection





- Cardiovascular Exam


Cardiovascular Exam: REGULAR RHYTHM





- GI/Abdominal Exam


GI & Abdominal Exam: Soft, Normal Bowel Sounds





- Extremities Exam


Extremities Exam: Normal Inspection





- Back Exam


Back Exam: NORMAL INSPECTION





- Neurological Exam


Neurological Exam: Alert, CN II-XII Intact, Oriented x3





- Skin


Skin Exam: Normal Color, Warm





Assessment and Plan


(1) Hypertension


Status: Acute   





(2) Paresthesia


Status: Acute   





(3) Cervical spine pain


Status: Chronic   





(4) Lower back pain


Status: Chronic

## 2018-07-21 NOTE — PN
DATE:  07/21/2018



SUBJECTIVE:  The patient denies any chest pain, shortness of breath has

improved.  The patient  underwent cardiac catheterization yesterday, which

revealed unremarkable coronary circulation.  The patient had allergic

response to the dye, which required Solu-Medrol, Pepcid, and Benadryl

intravenously with subsequent improvement.  The patient did have a bleeding

episode at the time of _____ removal; however, right groin duplex scan was

performed and was reported to be negative for hematoma or pseudoaneurysm. 

The patient denies any chest pain.  She did report groin pain, but no

bleeding.



PHYSICAL EXAMINATION:

VITAL SIGNS:  Blood pressure 147/86, heart rate 82, temperature 98,

respirations 20.

HEENT:  Normocephalic.

CHEST:  Clear.

HEART:  S1 and S2 regular.

EXTREMITIES:  Trace leg edema.  No palpable hematoma and no audible bruit,

and no palpable thrill.  Adequate distal pulses.



ASSESSMENT:  Hypertensive cardiomyopathy.



RECOMMENDATIONS:  Continue Coreg 3.125 mg twice a day, Lasix 20 mg

intravenously daily, Lipitor 10 mg once a day, subcutaneous Lovenox 40 mg

once a day, Vasotec 10 mg twice a day.  May consider oral Aldactone upon

discharge instead of Lasix.







__________________________________________

Terrance Baker MD



DD:  07/21/2018 13:48:47

DT:  07/21/2018 18:26:35

Job # [id number]

## 2018-07-22 VITALS
SYSTOLIC BLOOD PRESSURE: 124 MMHG | RESPIRATION RATE: 20 BRPM | HEART RATE: 73 BPM | DIASTOLIC BLOOD PRESSURE: 91 MMHG | TEMPERATURE: 98.2 F | OXYGEN SATURATION: 100 %

## 2018-07-22 RX ADMIN — PANTOPRAZOLE SODIUM SCH MG: 40 TABLET, DELAYED RELEASE ORAL at 08:41

## 2018-07-22 NOTE — CP.PCM.DIS
Provider





- Provider


Date of Admission: 


07/19/18 08:51





Attending physician: 


Greg Hood MD








Diagnosis





- Discharge Diagnosis


(1) Hypertension


Status: Acute   





(2) Paresthesia


Status: Acute   





(3) Cervical spine pain


Status: Chronic   





(4) Lower back pain


Status: Chronic   





Hospital Course





- Lab Results


Lab Results: 


 Most Recent Lab Values











WBC  7.9 K/uL (4.8-10.8)   07/17/18  06:28    


 


RBC  4.15 Mil/uL (3.80-5.20)   07/17/18  06:28    


 


Hgb  13.4 g/dL (12.0-16.0)   07/17/18  06:28    


 


Hct  38.7 % (34.0-47.0)   07/17/18  06:28    


 


MCV  93.2 fl (81.0-99.0)   07/17/18  06:28    


 


MCH  32.4 pg (27.0-31.0)  H  07/17/18  06:28    


 


MCHC  34.7 g/dL (33.0-37.0)   07/17/18  06:28    


 


RDW  13.5 % (11.5-14.5)   07/17/18  06:28    


 


Plt Count  203 K/uL (130-400)   07/17/18  06:28    


 


MPV  9.2 fl (7.2-11.7)   07/17/18  06:28    


 


Neut % (Auto)  42.9 % (50.0-75.0)  L  07/17/18  06:28    


 


Lymph % (Auto)  47.7 % (20.0-40.0)  H  07/17/18  06:28    


 


Mono % (Auto)  7.4 % (0.0-10.0)   07/17/18  06:28    


 


Eos % (Auto)  1.2 % (0.0-4.0)   07/17/18  06:28    


 


Baso % (Auto)  0.8 % (0.0-2.0)   07/17/18  06:28    


 


Neut # (Auto)  3.4 K/uL (1.8-7.0)   07/17/18  06:28    


 


Lymph # (Auto)  3.8 K/uL (1.0-4.3)   07/17/18  06:28    


 


Mono # (Auto)  0.6 K/uL (0.0-0.8)   07/17/18  06:28    


 


Eos # (Auto)  0.1 K/uL (0.0-0.7)   07/17/18  06:28    


 


Baso # (Auto)  0.1 K/uL (0.0-0.2)   07/17/18  06:28    


 


PT  11.1 Seconds (9.8-13.1)   07/18/18  14:25    


 


INR  1.0  (0.9-1.2)   07/18/18  14:25    


 


APTT  35.6 Seconds (25.6-37.1)   07/18/18  14:25    


 


D-Dimer, Quantitative  340 ng/mlDDU (0-230)  H  07/18/18  14:25    


 


Sodium  141 mmol/l (132-148)   07/20/18  04:20    


 


Potassium  4.4 MMOL/L (3.6-5.0)   07/20/18  04:20    


 


Chloride  104 mmol/L ()   07/20/18  04:20    


 


Carbon Dioxide  26 mmol/L (22-30)   07/20/18  04:20    


 


Anion Gap  15  (10-20)   07/20/18  04:20    


 


BUN  16 mg/dl (7-17)   07/20/18  04:20    


 


Creatinine  1.1 mg/dl (0.7-1.2)   07/20/18  04:20    


 


Est GFR ( Amer)  > 60   07/20/18  04:20    


 


Est GFR (Non-Af Amer)  54   07/20/18  04:20    


 


POC Glucose (mg/dL)  78 mg/dL ()   07/17/18  07:55    


 


Random Glucose  92 mg/dL ()   07/20/18  04:20    


 


Calcium  8.9 mg/dL (8.4-10.2)   07/20/18  04:20    


 


Magnesium  2.1 MG/DL (1.6-2.3)   07/20/18  04:20    


 


Troponin I  < 0.0120 ng/mL (0.00-0.120)   07/19/18  05:33    


 


Triglycerides  58 mg/DL (0-149)   07/18/18  04:20    


 


Cholesterol  143 mg/dL (0-199)   07/18/18  04:20    


 


LDL Cholesterol Direct  71 mg/dL (0-129)   07/18/18  04:20    


 


HDL Cholesterol  45 MG/DL (30-70)   07/18/18  04:20    


 


Vitamin B12  353 pg/mL (239-931)   07/18/18  04:20    


 


Folate  14.7 ng/mL  07/18/18  04:20    


 


Thyroxine (T4)  8.53 ug/dl (5.5-11.0)   07/18/18  04:20    


 


Total T3  1.31 nmol/L (1.49-2.60)  L  07/18/18  04:20    


 


TSH 3rd Generation  1.13 mIU/ML (0.46-4.68)   07/18/18  04:20    


 


Urine Opiates Screen  Negative  (NEGATIVE)   07/19/18  19:30    


 


Urine Methadone Screen  Negative  (NEGATIVE)   07/19/18  19:30    


 


Ur Barbiturates Screen  Negative  (NEGATIVE)   07/19/18  19:30    


 


Ur Phencyclidine Scrn  Negative  (NEGATIVE)   07/19/18  19:30    


 


Ur Amphetamines Screen  Negative  (NEGATIVE)   07/19/18  19:30    


 


U Benzodiazepines Scrn  Negative  (NEGATIVE)   07/19/18  19:30    


 


U Oth Cocaine Metabols  Negative  (NEGATIVE)   07/19/18  19:30    


 


U Cannabinoids Screen  Negative  (NEGATIVE)   07/19/18  19:30    














Discharge Exam





- Head Exam


Head Exam: ATRAUMATIC, NORMOCEPHALIC





Discharge Plan





- Follow Up Plan


Condition: FAIR


Disposition: HOME/ ROUTINE

## 2018-07-23 NOTE — CARDCATH
PROCEDURE DATE:  2018



INDICATIONS:  The patient is a 47-year-old female who has history of

hypertension, was admitted because of syncopal episode.  Echocardiographic

study was suggestive of ischemic cardiomyopathy.  Cardiac catheterization

was recommended.  The procedure and its risks were fully explained to the

patient who understood and agreed for the procedure.



PROCEDURE:  After local infiltration with 1% lidocaine, a 6-Hungarian sheath

was placed in the right femoral artery.  Left and right coronary

angiography was performed with 6-Hungarian JL4 and JR4 diagnostic catheters.



ANGIOGRAPHIC FINDINGS:  Selective injection of the left coronary artery

revealed the left main to be a very short narrowed caliber vessel, but no

significant lesion is noted.  The left main trifurcated into medium

medium-sized LAD, medium-sized circumflex, and a small ramus intermedius

artery.  The entire left coronary artery circulation was angiographically

unremarkable except that the LAD has tapered into a small caliber vessel of

about 1 mm and its middle and distal portion.  The proximal portion of the

LAD appears to be an intramuscular.  Following the completion of the left

coronary angiography, the patient had an allergic response to the dye

contrast where she felt sore throat, nausea and vomiting, and her face

became edematous.  Further angiographic imaging was halted, and the patient

received 125 mg of IV Solu-Medrol, 50 mg of IV Benadryl and 20 mg of IV

Pepcid and after complete stabilization, right coronary angiography

performed with 6-Hungarian JR4 diagnostic catheter, which revealed

unremarkable right coronary angiogram.  At this point, the procedure was

concluded.  The patient was _____ in view of allergic reaction.  It is

notable that the patient never had allergy to shellfish in the past,

although she has allergy to penicillin and does not recall having any

reaction during any of her surgical procedures in the past including

 section.



CONCLUSION:

1.  Unremarkable coronary artery circulation.

2.  Nonischemic cardiomyopathy, mostly likely hypertensive.



RECOMMENDATIONS:  The patient can be started on Aldactone, ACE inhibitors,

and beta blockers.





__________________________________________

Terrance Baker MD





DD:  2018 13:01:04

DT:  2018 18:33:10

Job # [id number]

## 2018-07-23 NOTE — VASCLAB
Date of service: 



07/20/2018



PROCEDURE:  Duplex Scan of the Right Groin



HISTORY:

Pseudoaneurysm



COMPARISON:

None available. 



TECHNIQUE:





FINDINGS:

NORMAL FLOW NOTED IN THE RIGHT COMMON FEMORAL,EXTERNAL ILIAC ARTERIES 

AND COMMON FEMORAL VEIN.



IMPRESSION:

NO PSEUDOANEURYSM NOTED IN THE RIGHT GROIN AREA.

## 2020-11-06 NOTE — CARD
--------------- APPROVED REPORT --------------





Date of service: 07/17/2018



EKG Measurement

Heart [id number]

NM 116P-3

[id number]

[id number]

[id number]



<Conclusion>

Normal sinus rhythm

Nonspecific T wave abnormality

Left ventricular hypertrophy 

Abnormal ECG 06-Nov-2020 14:34